# Patient Record
Sex: MALE | Race: WHITE | Employment: UNEMPLOYED | URBAN - METROPOLITAN AREA
[De-identification: names, ages, dates, MRNs, and addresses within clinical notes are randomized per-mention and may not be internally consistent; named-entity substitution may affect disease eponyms.]

---

## 2021-01-05 ENCOUNTER — HOSPITAL ENCOUNTER (EMERGENCY)
Age: 36
Discharge: HOME | End: 2021-01-05
Payer: COMMERCIAL

## 2021-01-05 ENCOUNTER — APPOINTMENT (OUTPATIENT)
Dept: GENERAL RADIOLOGY | Age: 36
End: 2021-01-05
Payer: COMMERCIAL

## 2021-01-05 VITALS
HEIGHT: 65 IN | WEIGHT: 138.89 LBS | TEMPERATURE: 98.7 F | SYSTOLIC BLOOD PRESSURE: 126 MMHG | DIASTOLIC BLOOD PRESSURE: 73 MMHG | RESPIRATION RATE: 18 BRPM | HEART RATE: 85 BPM | BODY MASS INDEX: 23.14 KG/M2 | OXYGEN SATURATION: 97 %

## 2021-01-05 DIAGNOSIS — S90.851A ACUTE FOREIGN BODY OF RIGHT FOOT, INITIAL ENCOUNTER: Primary | ICD-10-CM

## 2021-01-05 PROCEDURE — 73630 X-RAY EXAM OF FOOT: CPT

## 2021-01-05 PROCEDURE — 99283 EMERGENCY DEPT VISIT LOW MDM: CPT

## 2021-01-05 RX ORDER — CEPHALEXIN 500 MG/1
1000 CAPSULE ORAL 2 TIMES DAILY
Qty: 40 CAPSULE | Refills: 0 | Status: SHIPPED | OUTPATIENT
Start: 2021-01-05 | End: 2021-01-15

## 2021-01-05 ASSESSMENT — ENCOUNTER SYMPTOMS: COLOR CHANGE: 0

## 2021-01-06 NOTE — ED PROVIDER NOTES
**ADVANCED PRACTICE PROVIDER, I HAVE EVALUATED THIS PATIENT**        [address] [facility]      Pt Name: Berta Smith  UAT:[id number]  [place] 1985  Date of evaluation: 1/5/2021  Provider: ALINA Fulton - CNP      Chief Complaint:    Chief Complaint   Patient presents with    Foot Injury     stepped on \"something\" yesterday. reports bleeding yesterday. HIV +. right foot. believes something is stuck. thinks tetanus is up to date. Nursing Notes, Past Medical Hx, Past Surgical Hx, Social Hx, Allergies, and Family Hx were all reviewed and agreed with or any disagreements were addressed in the HPI.    HPI:  (Location, Duration, Timing, Severity, Quality, Assoc Sx, Context, Modifying factors)  This is a  37 y.o. male who presents to the emergency department today complaining of a foreign body in his right foot. Onset was yesterday. The context was that he stepped on something and does not know what it was. Bleeding controlled. No numbness or weakness. Tetanus status UTD. PastMedical/Surgical History:      Diagnosis Date    Bipolar 1 disorder with moderate london (HCC)     Chronic mental illness     ? schizophrenia per pt    HIV ([facility] immunodeficiency virus infection) ([facility]     IV drug abuse ([facility]     opiates/ meth/ heroin    Non-compliant behavior     also non-compliant with meds         Procedure Laterality Date    FRACTURE SURGERY      HIP SURGERY         Medications:  Previous Medications    SMYLZVC-PVZUZ-PDKYONAK-TENOFAF (GENVOYA) [id number] MG TABS    Take 1 tablet by mouth Daily          Review of Systems:  Review of Systems   Constitutional: Negative for chills, diaphoresis and fever. Musculoskeletal: Positive for arthralgias and gait problem (right foot). Negative for joint swelling. Skin: Negative for color change and wound. Allergic/Immunologic: Negative for immunocompromised state.    Neurological: Negative for weakness and numbness. Hematological: Negative for adenopathy. Psychiatric/Behavioral: Negative for confusion. All other systems reviewed and are negative. Positives and Pertinent negatives as per HPI. Except as noted above in the ROS, problem specific ROS was completed and is negative. Physical Exam:  Physical Exam  Vitals signs and nursing note reviewed. Constitutional:       General: He is not in acute distress. Appearance: Normal appearance. He is well-developed. He is not toxic-appearing. HENT:      Head: Normocephalic and atraumatic. Eyes:      General: No scleral icterus. Conjunctiva/sclera: Conjunctivae normal.   Neck:      Musculoskeletal: Normal range of motion. Vascular: No JVD. Cardiovascular:      Rate and Rhythm: Normal rate and regular rhythm. Pulses:           Dorsalis pedis pulses are 2+ on the right side. Pulmonary:      Effort: Pulmonary effort is normal. No respiratory distress. Abdominal:      Palpations: Abdomen is not rigid. Musculoskeletal: Normal range of motion. Feet:    Feet:      Comments: Tenderness with no skin changes. No foreign body visualized. No neurovascular deficits. Skin:     General: Skin is warm and dry. Capillary Refill: Capillary refill takes less than 2 seconds. Neurological:      General: No focal deficit present. Mental Status: He is alert and oriented to person, place, and time. Cranial Nerves: Cranial nerves are intact. Sensory: Sensation is intact. Motor: Motor function is intact.    Psychiatric:         Mood and Affect: Mood normal.         MEDICAL DECISION MAKING    Vitals:    Vitals:    01/05/21 1941 01/05/21 2145   BP: (!) 144/94 126/73   Pulse: 110 85   Resp: 17 18   Temp: 98.1 °F (36.7 °C) 98.7 °F (37.1 °C)   TempSrc: Temporal Oral   SpO2: 99% 97%   Weight: 138 lb 14.2 oz (63 kg)    Height: 5' 5\" (1.651 m)        LABS:Labs Reviewed - No data to display     Remainder of labs reviewed and werenegative at this time or not returned at the time of this note. RADIOLOGY:   Non-plain film images such as CT, Ultrasound and MRI are read by the radiologist. ALINA Lo CNP have directly visualized the radiologic plain film image(s) with the below findings:        Interpretation per the Radiologist below, if available at the time of this note:    XR FOOT RIGHT (MIN 3 VIEWS)   Final Result   No acute osseous abnormality right foot. 8 mm sliver like retained radiopaque foreign body plantar soft tissues   superficial to the proximal aspect of the 2nd toe. Follow-up imaging recommended if pain persists or worsens following   conservative management. Xr Foot Right (min 3 Views)    Result Date: 1/5/2021  EXAMINATION: 3  XRAY VIEWS OF THE RIGHT FOOT 1/5/2021 8:01 pm COMPARISON: None. HISTORY: ORDERING SYSTEM PROVIDED HISTORY: stepped on \"something\" yesterday. reports bleeding yesterday. HIV +. right foot. believes something is stuck. thinks tetanus is up to date. TECHNOLOGIST PROVIDED HISTORY: Reason for exam:-> stepped on \"something\" yesterday. reports bleeding yesterday. HIV +. right foot. believes something is stuck. thinks tetanus is up to date. Reason for Exam: stepped on \"something\" yesterday. reports bleeding yesterday Acuity: Acute Type of Exam: Initial FINDINGS: Osseous structures of the right foot appear intact and are aligned normally. Joint spaces appear well maintained. Soft tissues are unremarkable in appearance. 8 mm sliver like retained radiopaque foreign body plantar soft tissues superficial to the proximal aspect of the 2nd toe. No acute osseous abnormality right foot. 8 mm sliver like retained radiopaque foreign body plantar soft tissues superficial to the proximal aspect of the 2nd toe. Follow-up imaging recommended if pain persists or worsens following conservative management.           MEDICAL DECISION MAKING / ED COURSE:      PROCEDURES: Procedures    None    Patient was given:  Medications - No data to display    Differential diagnosis: Necrotizing fasciitis, cellulitis, erysipelas, suppurative thrombophlebitis, aseptic superficial thrombophlebitis, DVT, gout, compartment syndrome, erythema migrans (lyme disease), contact dermatitis, lymphedema, other    Patient seen and examined today for foreign body right foot. See HPI for patient presentation. Patient is hemodynamically stable, nontoxic, afebrile, and without tachycardia, tachypnea, and hypoxia. Physical exam as above. 37year-old lying in bed in no acute distress. Tenderness to the plantar aspect of right foot with no skin changes. No foreign body visualized. No neurovascular deficits. X-ray does show an 8 mm foreign body. Patient will be started on Keflex prophylactically and given referral to podiatrist to follow-up with tomorrow to have foreign body removed. Tetanus UTD. At this time, the evidence for any other entities in the differential is insufficient to justify any further testing. This was explained to the patient. The patient was advised that persistent or worsening symptoms will require further evaluation. The patient tolerated their visit well. I evaluated the patient. The physician was available for consultation as needed. The patient and / or the family were informed of the results of any tests, a time was given to answer questions, a plan was proposed and they agreed with plan. CLINICAL IMPRESSION:  1.  Acute foreign body of right foot, initial encounter        DISPOSITION Decision To Discharge 01/05/2021 09:52:30 PM      PATIENT REFERRED TO:  Jayme Upton DPM  [address] [facility]  [address] [place] [address],[address] [facility]  [phone]    Schedule an appointment as soon as possible for a visit       Radha Luu  [phone]  Call       [facility] Emergency Department  [id number]  [phone]  Go to As needed      DISCHARGE MEDICATIONS:  New Prescriptions    CEPHALEXIN (KEFLEX) 500 MG CAPSULE    Take 2 capsules by mouth 2 times daily for 10 days       DISCONTINUED MEDICATIONS:  Discontinued Medications    No medications on file              (Please note the MDM and HPI sections of this note were completed with a voice recognition program.  Efforts were made to edit the dictations but occasionally words are mis-transcribed.)    Electronically signed, ALINA Turcios CNP,           ALINA Turcios CNP  01/05/21 9119

## 2021-05-09 PROCEDURE — 99284 EMERGENCY DEPT VISIT MOD MDM: CPT

## 2021-05-10 ENCOUNTER — HOSPITAL ENCOUNTER (EMERGENCY)
Age: 36
Discharge: HOME | End: 2021-05-10
Attending: STUDENT IN AN ORGANIZED HEALTH CARE EDUCATION/TRAINING PROGRAM
Payer: COMMERCIAL

## 2021-05-10 VITALS
DIASTOLIC BLOOD PRESSURE: 68 MMHG | HEIGHT: 65 IN | OXYGEN SATURATION: 99 % | SYSTOLIC BLOOD PRESSURE: 118 MMHG | TEMPERATURE: 98 F | WEIGHT: 128.97 LBS | RESPIRATION RATE: 16 BRPM | BODY MASS INDEX: 21.49 KG/M2 | HEART RATE: 86 BPM

## 2021-05-10 DIAGNOSIS — B35.1 ONYCHOMYCOSIS: ICD-10-CM

## 2021-05-10 DIAGNOSIS — L03.116 CELLULITIS OF BOTH FEET: Primary | ICD-10-CM

## 2021-05-10 DIAGNOSIS — L03.115 CELLULITIS OF BOTH FEET: Primary | ICD-10-CM

## 2021-05-10 DIAGNOSIS — B35.3 TINEA PEDIS OF BOTH FEET: ICD-10-CM

## 2021-05-10 PROCEDURE — 6370000000 HC RX 637 (ALT 250 FOR IP): Performed by: STUDENT IN AN ORGANIZED HEALTH CARE EDUCATION/TRAINING PROGRAM

## 2021-05-10 RX ORDER — MUPIROCIN CALCIUM 20 MG/G
CREAM TOPICAL
Qty: 1 TUBE | Refills: 0 | Status: SHIPPED | OUTPATIENT
Start: 2021-05-10 | End: 2021-06-09

## 2021-05-10 RX ORDER — IBUPROFEN 600 MG/1
600 TABLET ORAL 4 TIMES DAILY PRN
Qty: 40 TABLET | Refills: 0 | Status: SHIPPED | OUTPATIENT
Start: 2021-05-10

## 2021-05-10 RX ORDER — DOXYCYCLINE HYCLATE 100 MG
100 TABLET ORAL 2 TIMES DAILY
Qty: 28 TABLET | Refills: 0 | Status: SHIPPED | OUTPATIENT
Start: 2021-05-10 | End: 2021-05-24

## 2021-05-10 RX ORDER — ACETAMINOPHEN 500 MG
1000 TABLET ORAL ONCE
Status: COMPLETED | OUTPATIENT
Start: 2021-05-10 | End: 2021-05-10

## 2021-05-10 RX ORDER — TERBINAFINE HYDROCHLORIDE 250 MG/1
250 TABLET ORAL DAILY
Qty: 42 TABLET | Refills: 0 | Status: SHIPPED | OUTPATIENT
Start: 2021-05-10 | End: 2021-06-21

## 2021-05-10 RX ADMIN — IBUPROFEN 600 MG: 400 TABLET, FILM COATED ORAL at 00:59

## 2021-05-10 RX ADMIN — ACETAMINOPHEN 1000 MG: 500 TABLET ORAL at 00:58

## 2021-05-10 ASSESSMENT — PAIN SCALES - GENERAL: PAINLEVEL_OUTOF10: 2

## 2021-05-10 NOTE — ED NOTES
Pt to ED with c/o bilateral foot pain. States he did drugs and was \"walking all over\" for the past \"few days\" which is what caused symptoms.        Albert Lucas RN  05/10/21 9243

## 2021-05-10 NOTE — ED PROVIDER NOTES
[address] [facility]      Pt Name: Marimar Landry  MRN: [id number]  [place] 1985  Date of evaluation: 5/9/2021  Provider: Evelyn Dowell MD    CHIEF COMPLAINT       Chief Complaint   Patient presents with    Foot Pain         HISTORY OF PRESENT ILLNESS   (Location/Symptom, Timing/Onset,Context/Setting, Quality, Duration, Modifying Factors, Severity)  Note limiting factors. Marimar Landry is a 37 y.o. male who presents to the emergency department c/o bilateral foot pain for the past 1 week. Onset gradual, progressively worsening, patient states he has been on his feet and walking for the past several days after using methamphetamines. Dates that he has not been awake, states that he is try to get off his feet. Associated with bilateral foot wounds, blistering, patient has ill fitting shoes and has been walking for approximately a week. States that he feels as if he is coming down off of his methamphetamines. Associated with itching and rash and small amt of redness between his toes. Able to bear weight but is painful and exacerbates the pain. Denies fevers, drainage, sensory loss, trauma. Symptoms not otherwise alleviated or exacerbated by other factors. NursingNotes were reviewed. REVIEW OF SYSTEMS    (2-9 systems for level 4, 10 or more for level 5)       Constitutional: No fever or chills. Eye: No visual disturbances. No eye pain. Ear/Nose/Mouth/Throat: No nasal congestion. No sore throat. Respiratory: No cough, No shortness of breath, No sputum production. Cardiovascular: No chest pain. No palpitations. Gastrointestinal: No abdominal pain. No nausea or vomiting  Genitourinary: No dysuria. No hematuria. Hematology/Lymphatics: No bleeding or bruising tendency. Immunologic: No malaise. No swollen glands. Musculoskeletal: No back pain. No joint pain. Integumentary: + foot rash, redness, wounds  Neurologic: No headache.  No focal numbness or weakness.       PAST MEDICAL HISTORY     Past Medical History:   Diagnosis Date    Bipolar 1 disorder with moderate london ([facility]     Chronic mental illness     ? schizophrenia per pt    HIV (human immunodeficiency virus infection) ([facility]     [facility] drug abuse ([facility]     opiates/ meth/ heroin    Non-compliant behavior     also non-compliant with meds         SURGICALHISTORY       Past Surgical History:   Procedure Laterality Date    FRACTURE SURGERY      HIP SURGERY           CURRENT MEDICATIONS       Discharge Medication List as of 5/10/2021  2:38 AM      CONTINUE these medications which have NOT CHANGED    Details   Qlmjhil-Lgqvz-Pzvfzlnw-TenofAF (GENVOYA) [id number] MG TABS Take 1 tablet by mouth Daily Historical Med             ALLERGIES     Vancomycin    FAMILY HISTORY       Family History   Problem Relation Age of Onset    Mental Illness Paternal Uncle     Mental Illness Paternal Grandmother           SOCIAL HISTORY       Social History     Socioeconomic History    Marital status: Single     Spouse name: None    Number of children: None    Years of education: None    Highest education level: None   Occupational History    None   Social Needs    Financial resource strain: None    Food insecurity     Worry: None     Inability: None    Transportation needs     Medical: None     Non-medical: None   Tobacco Use    Smoking status: Current Every Day Smoker     Packs/day: 0.50     Types: Cigarettes    Smokeless tobacco: Never Used   Substance and Sexual Activity    Alcohol use: Yes     Comment: daily    Drug use: Yes     Types: Methamphetamines, IV, Opiates      Comment: used meth 10/8/17    Sexual activity: Yes     Partners: Male, Female     Comment: sexual prostitute    Lifestyle    Physical activity     Days per week: None     Minutes per session: None    Stress: None   Relationships    Social connections     Talks on phone: None     Gets together: None     Attends Anabaptism service: None     Active member of club or organization: None     Attends meetings of clubs or organizations: None     Relationship status: None    Intimate partner violence     Fear of current or ex partner: None     Emotionally abused: None     Physically abused: None     Forced sexual activity: None   Other Topics Concern    None   Social History Narrative    None       SCREENINGS             PHYSICAL EXAM    (up to 7 for level 4, 8 or more for level 5)     ED Triage Vitals [05/10/21 0006]   BP Temp Temp Source Pulse Resp SpO2 Height Weight   121/72 98.2 °F (36.8 °C) Oral 110 20 99 % 5' 5\" (1.651 m) 128 lb 12 oz (58.4 kg)       General: Alert and oriented appropriately for age, No acute distress. Eye: Normal conjunctiva. Pupils equal and reactive. HENT: Oral mucosa is moist.  Respiratory: Respirations even and non-labored. Clear to auscultation bilaterally. Cardiovascular: Normal rate, Regular rhythm. Gastrointestinal: Soft, Non-tender, Non-distended. Musculoskeletal: No swelling. Bilateral ankle joints with full range of motion, no tenderness to palpation. No effusions. Full plantar flexion dorsiflexion of the feet bilaterally. Able to range all toes on the bilateral feet without difficulty. Integumentary: Warm, Dry. Onychomycosis present on most toes bilaterally, some bruising associated with the hallux on the right. No deformities. Skin changes consistent with tinea pedis present within the toe webspaces bilaterally, no burrows. Associated with erythema between the toes, involving the dorsum of the foot on the right, dorsum and posterior aspect of the heel on the left. No underlying induration or fluctuance. There are some open wounds that appear to be ruptured blisters without bullous changes. No proximally tracking erythema up the ankles or the lower extremities bilaterally. No crepitus. Neurologic: Alert and appropriate for age. No focal deficits. Psychiatric: Cooperative.   No process somewhat tangential but can follow a coherent line of thought, content of our conversation pertains to the matter at hand. No flight of ideas. No grandiosity. No apparent response to any auditory or visual hallucinations. No SI, no HI.    DIAGNOSTIC RESULTS       EMERGENCY DEPARTMENT COURSE and DIFFERENTIAL DIAGNOSIS/MDM:   Vitals:    Vitals:    05/10/21 0027 05/10/21 0030 05/10/21 0045 05/10/21 0245   BP: 123/69 125/81 125/75 118/68   Pulse: 108 92 85 86   Resp: 17 15 17 16   Temp: 98.3 °F (36.8 °C)   98 °F (36.7 °C)   TempSrc: Oral   Oral   SpO2: 100% 99% 98% 99%   Weight: 128 lb 15.5 oz (58.5 kg)      Height: 5' 5\" (1.651 m)            Medical decision makin-year-old man with history of bipolar disorder, polysubstance abuse, endorses use of methamphetamines for the past week, states that after he used methamphetamines has been up for several days, walking throughout the city, now feels like he is coming down from his high and has pain in his feet bilaterally, on examination he has blisters in his feet likely from ill fitting shoes with associated tinea pedis and onychomycosis bilaterally. He has associated erythema that comes from the area of the visualized fungal infection, consistent with cellulitis without features that would suggest necrotizing soft tissue infection. No crepitus, no proximally tracking erythema, no underlying fluctuance or induration to suggest abscess. No purulence expressed or visualized from the open wounds. Hemodynamically stable, does not appear acutely manic at this time, denies SI, HI. Is prescribed antifungal and antibiotic medications for treatment of his cellulitis, tinea pedis bilaterally. He is given discharge instructions, return precautions for any symptomatic worsening, recommend ibuprofen for pain, do not think acute underlying fracture on repeat assessment. Patient stable for and amenable to discharge home.   Tolerated p.o., ambulated steadily from the emergency department upon discharge. Given outpatient podiatry follow-up. FINAL IMPRESSION      1. Cellulitis of both feet    2. Tinea pedis of both feet    3. Onychomycosis          DISPOSITION/PLAN   DISPOSITION Decision To Discharge 05/10/2021 02:48:17 AM      PATIENT REFERRED TO:  [facility] Emergency Department  [address] [facility]  [phone]    If symptoms worsen    [facility]  [address] [id number]  [phone]  In 2 days        DISCHARGE MEDICATIONS:  Discharge Medication List as of 5/10/2021  2:38 AM      START taking these medications    Details   doxycycline hyclate (VIBRA-TABS) 100 MG tablet Take 1 tablet by mouth 2 times daily for 14 days, Disp-28 tablet, R-0Print      terbinafine (LAMISIL) 250 MG tablet Take 1 tablet by mouth daily, Disp-42 tablet, R-0Print      mupirocin (BACTROBAN) 2 % cream Apply topically 3 times daily. , Disp-1 Tube, R-0, Print      ibuprofen (ADVIL;MOTRIN) 600 MG tablet Take 1 tablet by mouth 4 times daily as needed for Pain, Disp-40 tablet, R-0Print                (Please note that portions of this note were completed with a voice recognition program.Efforts were made to edit the dictations but occasionally words are mis-transcribed.)    Hermann Duran MD (electronically signed)  Attending Emergency Physician          Hermann Duran MD  05/12/21 7134

## 2024-05-13 ENCOUNTER — TELEPHONE (OUTPATIENT)
Dept: PRIMARY CARE CLINIC | Age: 39
End: 2024-05-13

## 2024-05-13 NOTE — TELEPHONE ENCOUNTER
Patient called because he was getting missed phone calls from our number. Patient just aquired his current cell phone number [phone] and it shows in our system for a different patient we have had in the past. Patient was nice and understood.